# Patient Record
Sex: FEMALE | URBAN - METROPOLITAN AREA
[De-identification: names, ages, dates, MRNs, and addresses within clinical notes are randomized per-mention and may not be internally consistent; named-entity substitution may affect disease eponyms.]

---

## 2019-12-11 ENCOUNTER — OFFICE VISIT (OUTPATIENT)
Dept: URBAN - METROPOLITAN AREA CLINIC 168 | Facility: CLINIC | Age: 24
End: 2019-12-11
Payer: OTHER GOVERNMENT

## 2019-12-11 DIAGNOSIS — G43.B0 OPHTHALMOPLEGIC MIGRAINE, NOT INTRACTABLE: Primary | ICD-10-CM

## 2019-12-11 DIAGNOSIS — H01.8 OTHER SPECIFIED INFLAMMATIONS OF EYELID: ICD-10-CM

## 2019-12-11 DIAGNOSIS — G43.909 MIGRAINE: ICD-10-CM

## 2019-12-11 PROCEDURE — 92004 COMPRE OPH EXAM NEW PT 1/>: CPT | Performed by: OPHTHALMOLOGY

## 2019-12-11 RX ORDER — NAPROXEN 500 MG/1
500 MG TABLET ORAL
Qty: 40 | Refills: 1 | Status: ACTIVE
Start: 2019-12-11

## 2019-12-11 RX ORDER — BACITRACIN ZINC AND POLYMYXIN B SULFATE 500; 10000 [USP'U]/G; [USP'U]/G
OINTMENT OPHTHALMIC
Qty: 1 | Refills: 6 | Status: ACTIVE
Start: 2019-12-11

## 2019-12-11 ASSESSMENT — VISUAL ACUITY
OS: 20/20
OD: 20/20

## 2019-12-11 ASSESSMENT — INTRAOCULAR PRESSURE
OS: 16
OD: 16

## 2019-12-11 NOTE — IMPRESSION/PLAN
Impression: Other specified inflammations of eyelid: H01.8. Plan: Recommend lid scrub BID with Ocusoft or diluted baby shampoo. Rec. Bacitracin-polymyxin ointment qHS OU.

## 2019-12-11 NOTE — IMPRESSION/PLAN
Impression: Ophthalmoplegic migraine, not intractable: G43. B0. Plan: Discussed diagnosis in detail with patient. No treatment is required at this time. Will continue to observe condition and or symptoms. Order visual field for baseline. Recheck MRX.